# Patient Record
Sex: MALE | Race: WHITE | NOT HISPANIC OR LATINO | Employment: FULL TIME | RURAL
[De-identification: names, ages, dates, MRNs, and addresses within clinical notes are randomized per-mention and may not be internally consistent; named-entity substitution may affect disease eponyms.]

---

## 2022-07-12 ENCOUNTER — OFFICE VISIT (OUTPATIENT)
Dept: FAMILY MEDICINE CLINIC | Facility: CLINIC | Age: 57
End: 2022-07-12

## 2022-07-12 ENCOUNTER — TELEPHONE (OUTPATIENT)
Dept: FAMILY MEDICINE CLINIC | Facility: CLINIC | Age: 57
End: 2022-07-12

## 2022-07-12 VITALS
HEART RATE: 78 BPM | SYSTOLIC BLOOD PRESSURE: 102 MMHG | BODY MASS INDEX: 26.96 KG/M2 | HEIGHT: 69 IN | OXYGEN SATURATION: 97 % | WEIGHT: 182 LBS | TEMPERATURE: 98.2 F | DIASTOLIC BLOOD PRESSURE: 80 MMHG | RESPIRATION RATE: 18 BRPM

## 2022-07-12 DIAGNOSIS — F17.210 CIGARETTE NICOTINE DEPENDENCE WITHOUT COMPLICATION: ICD-10-CM

## 2022-07-12 DIAGNOSIS — Z13.220 SCREENING FOR HYPERLIPIDEMIA: ICD-10-CM

## 2022-07-12 DIAGNOSIS — Z13.1 SCREENING FOR DIABETES MELLITUS: ICD-10-CM

## 2022-07-12 DIAGNOSIS — F17.200 TOBACCO USE DISORDER: ICD-10-CM

## 2022-07-12 DIAGNOSIS — N52.9 ERECTILE DYSFUNCTION, UNSPECIFIED ERECTILE DYSFUNCTION TYPE: ICD-10-CM

## 2022-07-12 DIAGNOSIS — Z00.01 ENCOUNTER FOR GENERAL ADULT MEDICAL EXAMINATION WITH ABNORMAL FINDINGS: Primary | ICD-10-CM

## 2022-07-12 DIAGNOSIS — Z12.5 PROSTATE CANCER SCREENING: ICD-10-CM

## 2022-07-12 PROCEDURE — 99396 PREV VISIT EST AGE 40-64: CPT | Performed by: FAMILY MEDICINE

## 2022-07-12 RX ORDER — SILDENAFIL CITRATE 20 MG/1
20 TABLET ORAL 3 TIMES DAILY
COMMUNITY
End: 2022-07-12 | Stop reason: SDUPTHER

## 2022-07-12 RX ORDER — SILDENAFIL CITRATE 20 MG/1
TABLET ORAL
Qty: 75 TABLET | Refills: 11 | Status: SHIPPED | OUTPATIENT
Start: 2022-07-12

## 2022-07-12 NOTE — PROGRESS NOTES
"Chief Complaint  Annual Exam (Patient being seen for annual exam.)    Subjective      Nathan Ortega presents to [facility]  History of Present Illness    Patient is here for annual exam.  Says you have been feeling well overall lately.  His erectile dysfunction is well controlled with sildenafil.  Patient had an abnormal CT of the chest in April of last year, and saw general surgeon for consultation and underwent PET/CT, which was reportedly okay.  He was supposed to have a follow-up CT 6 months later, but he states that they never contacted him about that time.  He got busy and forgot about it.  Therefore he is due today.  Denies any symptoms of lung cancer, but he does still smoke and knows he needs to quit.  Objective   Vital Signs:   Vitals:    07/12/22 0842   BP: 102/80   BP Location: Left arm   Patient Position: Sitting   Cuff Size: Adult   Pulse: 78   Resp: 18   Temp: 98.2 °F (36.8 °C)   SpO2: 97%   Weight: 82.6 kg (182 lb)   Height: 175.3 cm (69\")      /80 (BP Location: Left arm, Patient Position: Sitting, Cuff Size: Adult)   Pulse 78   Temp 98.2 °F (36.8 °C)   Resp 18   Ht 175.3 cm (69\")   Wt 82.6 kg (182 lb)   SpO2 97%   BMI 26.88 kg/m²     Body mass index is 26.88 kg/m².    Review of Systems   Constitutional: Negative for chills, fever and unexpected weight loss.   HENT: Negative for ear discharge, ear pain, mouth sores, nosebleeds, rhinorrhea, sinus pressure, sore throat, swollen glands, trouble swallowing and voice change.    Eyes: Negative for blurred vision, double vision, pain, redness and visual disturbance.   Respiratory: Negative for cough, shortness of breath and wheezing.    Cardiovascular: Negative for chest pain, palpitations and leg swelling.        PND, orthopnea   Gastrointestinal: Negative for abdominal distention, abdominal pain, anal bleeding, blood in stool, constipation, diarrhea, nausea, vomiting and GERD.        Dysphagia, odynophagia "   Endocrine: Negative for polydipsia, polyphagia and polyuria.   Genitourinary: Positive for erectile dysfunction. Negative for dysuria, frequency, hematuria, urgency and urinary incontinence.   Musculoskeletal: Negative for arthralgias (unusual/atypica), gait problem, joint swelling and myalgias.   Skin: Negative for rash, skin lesions (worrisome/suspicious) and bruise.   Allergic/Immunologic: Negative for food allergies.   Neurological: Negative for dizziness, tremors, seizures, syncope, weakness, numbness and headache.   Hematological: Negative for adenopathy. Does not bruise/bleed easily.   Psychiatric/Behavioral: Negative for suicidal ideas and depressed mood. The patient is not nervous/anxious.        Past History:  Medical History: has a past medical history of Backache, ED (erectile dysfunction), Osteoarthritis, Skin cancer, and Tobacco user.   Surgical History: has a past surgical history that includes Neck surgery (2010) and Skin cancer excision.   Family History: family history includes COPD in his brother; Diabetes in his father; Liver cancer in his father; Osteoarthritis in his mother; Peripheral vascular disease in his father; Rheum arthritis in his maternal aunt; Sleep apnea in his brother; Throat cancer in his brother.   Social History: reports that he has been smoking. He does not have any smokeless tobacco history on file.      Current Outpatient Medications:   •  sildenafil (REVATIO) 20 MG tablet, 1-5 pills po 1hr before intercourse prn, Disp: 75 tablet, Rfl: 11    Allergies: Patient has no known allergies.    Physical Exam  Constitutional:       General: He is not in acute distress.     Appearance: He is normal weight. He is not toxic-appearing.   HENT:      Head: Normocephalic and atraumatic.      Right Ear: Tympanic membrane, ear canal and external ear normal.      Left Ear: Tympanic membrane, ear canal and external ear normal.      Nose: Nose normal. No rhinorrhea.      Mouth/Throat:       Mouth: Mucous membranes are moist.      Pharynx: Oropharynx is clear. No posterior oropharyngeal erythema.   Eyes:      General: No scleral icterus.     Extraocular Movements: Extraocular movements intact.      Conjunctiva/sclera: Conjunctivae normal.      Pupils: Pupils are equal, round, and reactive to light.   Neck:      Vascular: No carotid bruit.   Cardiovascular:      Rate and Rhythm: Normal rate and regular rhythm.      Pulses: Normal pulses.      Heart sounds: Normal heart sounds. No murmur heard.    No gallop.   Pulmonary:      Effort: Pulmonary effort is normal.      Breath sounds: Normal breath sounds. No wheezing, rhonchi or rales.   Chest:      Chest wall: No tenderness.   Abdominal:      General: Bowel sounds are normal. There is no distension.      Palpations: Abdomen is soft. There is no mass.      Tenderness: There is no abdominal tenderness. There is no guarding or rebound.   Musculoskeletal:         General: No swelling, tenderness or deformity. Normal range of motion.      Cervical back: Normal range of motion. No rigidity.      Right lower leg: No edema.      Left lower leg: No edema.   Lymphadenopathy:      Cervical: No cervical adenopathy.   Skin:     General: Skin is warm and dry.      Capillary Refill: Capillary refill takes less than 2 seconds.      Coloration: Skin is not pale.      Findings: No erythema or rash.   Neurological:      General: No focal deficit present.      Mental Status: He is alert and oriented to person, place, and time.      Cranial Nerves: No cranial nerve deficit.      Motor: No weakness.      Coordination: Coordination normal.      Gait: Gait normal.   Psychiatric:         Mood and Affect: Mood normal.         Behavior: Behavior normal.         Thought Content: Thought content normal.         Judgment: Judgment normal.          Result Review :                 Assessment and Plan   Diagnoses and all orders for this visit:    1. Encounter for general adult medical  examination with abnormal findings (Primary)  -     CBC Auto Differential; Future  -     Comprehensive Metabolic Panel; Future  Healthy lifestyle including diet and exercise (which she already does) along with smoking cessation were discussed.  Preventive healthcare measures were discussed.  We will schedule low-dose CT scan of the chest.  We will check labs and refill his sildenafil.  I will see him back in 1 year or sooner if needed.  2. Screening for hyperlipidemia  -     Lipid Panel; Future    3. Screening for diabetes mellitus  -     Hemoglobin A1c; Future    4. Prostate cancer screening  -     PSA Screen; Future    5. Erectile dysfunction, unspecified erectile dysfunction type    6. Tobacco use disorder  -     CT Chest Low Dose Wo; Future    7. Cigarette nicotine dependence without complication  -     CT Chest Low Dose Wo; Future  Failed to mention above, but he had a Cologuard in May 2020, so does not need colon cancer screening until next May other orders  -     sildenafil (REVATIO) 20 MG tablet; 1-5 pills po 1hr before intercourse prn  Dispense: 75 tablet; Refill: 11                 Follow Up   No follow-ups on file.  Patient was given instructions and counseling regarding his condition or for health maintenance advice. Please see specific information pulled into the AVS if appropriate.     Derek العلي MD

## 2022-07-12 NOTE — TELEPHONE ENCOUNTER
Please let patient know that I had some time to review his chart, and I cannot find any evidence that he has had colon cancer screening.  If he has had a colonoscopy or done a Cologuard recently, and please obtain the information from the appropriate source and let me know.  If he is not doing anything, can those are the best to options with colonoscopy being the gold standard.  If he does not have any history of polyps and has no first degree relatives (parents or sibling) with a history of colon cancer or polyps, then we can order a Cologuard kit that will be sent to his home.  It is 92% accurate on detecting abnormal DNA in the stool.  The colonoscopy is usually done every 10 years, but the Cologuard is every 3 years.  See if he has a preference and let me know.  Both test are fully covered by insurance

## 2022-07-13 LAB
ALBUMIN SERPL-MCNC: 4.5 G/DL (ref 3.8–4.9)
ALBUMIN/GLOB SERPL: 2 {RATIO} (ref 1.2–2.2)
ALP SERPL-CCNC: 65 IU/L (ref 44–121)
ALT SERPL-CCNC: 15 IU/L (ref 0–44)
AST SERPL-CCNC: 14 IU/L (ref 0–40)
BASOPHILS # BLD AUTO: 0.1 X10E3/UL (ref 0–0.2)
BASOPHILS NFR BLD AUTO: 1 %
BILIRUB SERPL-MCNC: 0.4 MG/DL (ref 0–1.2)
BUN SERPL-MCNC: 13 MG/DL (ref 6–24)
BUN/CREAT SERPL: 13 (ref 9–20)
CALCIUM SERPL-MCNC: 9.5 MG/DL (ref 8.7–10.2)
CHLORIDE SERPL-SCNC: 103 MMOL/L (ref 96–106)
CHOLEST SERPL-MCNC: 158 MG/DL (ref 100–199)
CO2 SERPL-SCNC: 23 MMOL/L (ref 20–29)
CREAT SERPL-MCNC: 1.03 MG/DL (ref 0.76–1.27)
EGFRCR SERPLBLD CKD-EPI 2021: 85 ML/MIN/1.73
EOSINOPHIL # BLD AUTO: 0.2 X10E3/UL (ref 0–0.4)
EOSINOPHIL NFR BLD AUTO: 2 %
ERYTHROCYTE [DISTWIDTH] IN BLOOD BY AUTOMATED COUNT: 12.6 % (ref 11.6–15.4)
GLOBULIN SER CALC-MCNC: 2.2 G/DL (ref 1.5–4.5)
GLUCOSE SERPL-MCNC: 91 MG/DL (ref 65–99)
HBA1C MFR BLD: 5.5 % (ref 4.8–5.6)
HCT VFR BLD AUTO: 47.1 % (ref 37.5–51)
HDLC SERPL-MCNC: 56 MG/DL
HGB BLD-MCNC: 16.5 G/DL (ref 13–17.7)
IMM GRANULOCYTES # BLD AUTO: 0 X10E3/UL (ref 0–0.1)
IMM GRANULOCYTES NFR BLD AUTO: 0 %
LDLC SERPL CALC-MCNC: 92 MG/DL (ref 0–99)
LYMPHOCYTES # BLD AUTO: 2.2 X10E3/UL (ref 0.7–3.1)
LYMPHOCYTES NFR BLD AUTO: 29 %
MCH RBC QN AUTO: 32.3 PG (ref 26.6–33)
MCHC RBC AUTO-ENTMCNC: 35 G/DL (ref 31.5–35.7)
MCV RBC AUTO: 92 FL (ref 79–97)
MONOCYTES # BLD AUTO: 0.6 X10E3/UL (ref 0.1–0.9)
MONOCYTES NFR BLD AUTO: 8 %
NEUTROPHILS # BLD AUTO: 4.6 X10E3/UL (ref 1.4–7)
NEUTROPHILS NFR BLD AUTO: 60 %
PLATELET # BLD AUTO: 282 X10E3/UL (ref 150–450)
POTASSIUM SERPL-SCNC: 5.1 MMOL/L (ref 3.5–5.2)
PROT SERPL-MCNC: 6.7 G/DL (ref 6–8.5)
PSA SERPL-MCNC: 0.9 NG/ML (ref 0–4)
RBC # BLD AUTO: 5.11 X10E6/UL (ref 4.14–5.8)
SODIUM SERPL-SCNC: 140 MMOL/L (ref 134–144)
TRIGL SERPL-MCNC: 49 MG/DL (ref 0–149)
VLDLC SERPL CALC-MCNC: 10 MG/DL (ref 5–40)
WBC # BLD AUTO: 7.6 X10E3/UL (ref 3.4–10.8)

## 2022-07-13 NOTE — TELEPHONE ENCOUNTER
Please make sure to get results off fax--that was just 2 months ago, and it's been a year since I saw him???

## 2022-08-08 ENCOUNTER — TELEPHONE (OUTPATIENT)
Dept: FAMILY MEDICINE CLINIC | Facility: CLINIC | Age: 57
End: 2022-08-08

## 2022-08-08 NOTE — TELEPHONE ENCOUNTER
Hub staff attempted to follow warm transfer process and was unsuccessful     Caller: Nathan Ortega    Relationship to patient: Self    Best call back number: [phone]    Patient is needing: PATIENT MISSED A CALL ABOUT GETTING A REFERRAL SCHEDULED FOR A CT SCAN.

## 2022-09-19 ENCOUNTER — TELEPHONE (OUTPATIENT)
Dept: FAMILY MEDICINE CLINIC | Facility: CLINIC | Age: 57
End: 2022-09-19

## 2022-09-19 NOTE — TELEPHONE ENCOUNTER
Caller: Nathan Ortega    Relationship: Self    Best call back number: [phone]    Caller requesting test results: SELF    What test was performed: CT SCAN    When was the test performed: 09/01/22    Where was the test performed: [facility]    Additional notes: WOULD LIKE A CALL BACK REGARDING RESULTS.WAITING AND HASN'T HEARD ANYTHING.

## 2024-09-27 RX ORDER — SILDENAFIL CITRATE 20 MG/1
TABLET ORAL
Qty: 100 TABLET | Refills: 0 | Status: SHIPPED | OUTPATIENT
Start: 2024-09-27

## 2024-10-18 ENCOUNTER — OFFICE VISIT (OUTPATIENT)
Dept: FAMILY MEDICINE CLINIC | Facility: CLINIC | Age: 59
End: 2024-10-18
Payer: COMMERCIAL

## 2024-10-18 VITALS
SYSTOLIC BLOOD PRESSURE: 128 MMHG | HEART RATE: 80 BPM | BODY MASS INDEX: 27.06 KG/M2 | DIASTOLIC BLOOD PRESSURE: 86 MMHG | WEIGHT: 189 LBS | HEIGHT: 70 IN | OXYGEN SATURATION: 97 %

## 2024-10-18 DIAGNOSIS — F17.210 CIGARETTE NICOTINE DEPENDENCE WITHOUT COMPLICATION: ICD-10-CM

## 2024-10-18 DIAGNOSIS — J20.9 ACUTE BRONCHITIS WITH WHEEZING: ICD-10-CM

## 2024-10-18 DIAGNOSIS — N52.9 ERECTILE DYSFUNCTION, UNSPECIFIED ERECTILE DYSFUNCTION TYPE: ICD-10-CM

## 2024-10-18 DIAGNOSIS — F17.200 TOBACCO USE DISORDER: ICD-10-CM

## 2024-10-18 DIAGNOSIS — Z00.01 ENCOUNTER FOR GENERAL ADULT MEDICAL EXAMINATION WITH ABNORMAL FINDINGS: Primary | ICD-10-CM

## 2024-10-18 DIAGNOSIS — Z12.5 PROSTATE CANCER SCREENING: ICD-10-CM

## 2024-10-18 PROCEDURE — 99396 PREV VISIT EST AGE 40-64: CPT | Performed by: FAMILY MEDICINE

## 2024-10-18 RX ORDER — SILDENAFIL CITRATE 20 MG/1
TABLET ORAL
Qty: 100 TABLET | Refills: 3 | Status: SHIPPED | OUTPATIENT
Start: 2024-10-18

## 2024-10-18 RX ORDER — ALBUTEROL SULFATE 90 UG/1
2 INHALANT RESPIRATORY (INHALATION) EVERY 4 HOURS PRN
Qty: 18 G | Refills: 2 | Status: SHIPPED | OUTPATIENT
Start: 2024-10-18

## 2024-10-18 NOTE — PROGRESS NOTES
"Chief Complaint  Annual Exam (Physical/Screenings needed)    Subjective      Nathan Ortega presents to [facility]  History of Present Illness  Patient is here for annual physical exam.  He says he has been feeling pretty well overall lately, although he has recently gotten over a URI, and has had some wheezing and cough over the last few weeks.  He denies any fever chills chest pain or hemoptysis.  The patient has a very high number of pack years smoking history but has never had COPD symptoms before.  He says that he is never really had any trouble with wheezing or prolonged cough like this, and while he feels like he is better, the wheezing and cough have been lingering on the last couple of weeks.  His wife also developed URI symptoms about a week ago but tested negative for COVID and the flu.    Patient has been through a stressful past year and that his wife was diagnosed with some type of rare autoimmune disorder that is significantly diminished her vision.  He has a lot of confusion and questions about this, but his wife is not my patient so I explained that I really could not answer the questions without knowing more, and he said that he will be going with her to an appointment to her rheumatologist in 2 weeks.  I strongly recommended that they put together a notebook and write down the things that she has been told, and write questions before hand to ask when she goes to the appointment.    Because of his wife's health problems, the patient has not been needing the sildenafil as often, but when he does use it it works well with no significant adverse effects.  Objective   Vital Signs:   Vitals:    10/18/24 1015   BP: 128/86   Pulse: 80   SpO2: 97%   Weight: 85.7 kg (189 lb)   Height: 177.8 cm (70\")      /86   Pulse 80   Ht 177.8 cm (70\")   Wt 85.7 kg (189 lb)   SpO2 97%   BMI 27.12 kg/m²     Body mass index is 27.12 kg/m².    Review of Systems   Constitutional:  " Negative for chills, diaphoresis, fever and unexpected weight loss.   HENT:  Negative for ear discharge, ear pain, mouth sores, nosebleeds, rhinorrhea, sinus pressure, sore throat, swollen glands and trouble swallowing.    Eyes:  Negative for blurred vision, double vision, pain, redness and visual disturbance.   Respiratory:  Positive for cough and wheezing. Negative for choking, chest tightness, shortness of breath and stridor.    Cardiovascular:  Negative for chest pain, palpitations and leg swelling.        PND, orthopnea   Gastrointestinal:  Negative for abdominal distention, abdominal pain, blood in stool, constipation, diarrhea, nausea, vomiting and GERD.        Dysphagia, odynophagia   Endocrine: Negative for polydipsia, polyphagia and polyuria.   Genitourinary:  Negative for dysuria, hematuria and urinary incontinence.   Musculoskeletal:  Negative for arthralgias (unusual/atypica), back pain, gait problem, joint swelling, myalgias and neck pain.   Skin:  Negative for rash, skin lesions (worrisome/suspicious) and bruise.   Allergic/Immunologic: Positive for environmental allergies. Negative for food allergies.   Neurological:  Negative for dizziness, tremors, seizures, syncope, weakness, light-headedness, numbness and headache.   Hematological:  Negative for adenopathy. Does not bruise/bleed easily.   Psychiatric/Behavioral:  Negative for suicidal ideas and depressed mood. The patient is not nervous/anxious.        Past History:  Medical History: has a past medical history of Backache, Colon cancer screening (07/2023), ED (erectile dysfunction), Osteoarthritis, Skin cancer, and Tobacco user.   Surgical History: has a past surgical history that includes Neck surgery (2010) and Skin cancer excision.   Family History: family history includes COPD in his brother; Diabetes in his father; Liver cancer in his father; Osteoarthritis in his mother; Peripheral vascular disease in his father; Rheum arthritis in his  maternal aunt; Sleep apnea in his brother; Throat cancer in his brother.   Social History: reports that he has been smoking cigarettes. He started smoking about 38 years ago. He has a 38.8 pack-year smoking history. He has been exposed to tobacco smoke. He has never used smokeless tobacco.      Current Outpatient Medications:     sildenafil (REVATIO) 20 MG tablet, TAKE 1-5 TABLETS BY MOUTH 1 HOUR BEFORE INTERCOURSE AS NEEDED, Disp: 100 tablet, Rfl: 3    albuterol sulfate  (90 Base) MCG/ACT inhaler, Inhale 2 puffs Every 4 (Four) Hours As Needed for Wheezing or Shortness of Air., Disp: 18 g, Rfl: 2    Allergies: Patient has no known allergies.    Physical Exam  Constitutional:       General: He is not in acute distress.     Appearance: He is not toxic-appearing.   HENT:      Head: Normocephalic and atraumatic.      Right Ear: Tympanic membrane, ear canal and external ear normal.      Left Ear: Tympanic membrane, ear canal and external ear normal.      Nose: Nose normal.      Mouth/Throat:      Mouth: Mucous membranes are moist.      Pharynx: Oropharynx is clear.   Eyes:      General: No scleral icterus.     Extraocular Movements: Extraocular movements intact.      Conjunctiva/sclera: Conjunctivae normal.      Pupils: Pupils are equal, round, and reactive to light.   Neck:      Vascular: No carotid bruit.   Cardiovascular:      Rate and Rhythm: Normal rate and regular rhythm.      Pulses: Normal pulses.      Heart sounds: Normal heart sounds. No murmur heard.     No gallop.   Pulmonary:      Effort: Pulmonary effort is normal.      Breath sounds: Wheezing (Diffuse some mild wheezes heard) present. No rhonchi or rales.   Chest:      Chest wall: No tenderness.   Abdominal:      General: Bowel sounds are normal. There is no distension.      Palpations: Abdomen is soft. There is no mass.      Tenderness: There is no abdominal tenderness. There is no guarding or rebound.   Musculoskeletal:         General: No  swelling or deformity. Normal range of motion.      Cervical back: Normal range of motion. No rigidity.      Right lower leg: No edema.      Left lower leg: No edema.   Lymphadenopathy:      Cervical: No cervical adenopathy.   Skin:     General: Skin is warm and dry.      Capillary Refill: Capillary refill takes less than 2 seconds.      Coloration: Skin is not jaundiced.      Findings: No bruising, erythema or rash.   Neurological:      General: No focal deficit present.      Mental Status: He is alert and oriented to person, place, and time.      Cranial Nerves: No cranial nerve deficit.      Motor: No weakness.      Coordination: Coordination normal.      Gait: Gait normal.   Psychiatric:         Mood and Affect: Mood normal.         Behavior: Behavior normal.         Thought Content: Thought content normal.         Judgment: Judgment normal.                   Assessment and Plan   Diagnoses and all orders for this visit:    1. Encounter for general adult medical examination with abnormal findings (Primary)  -     CBC & Differential  -     Comprehensive Metabolic Panel  -     Hemoglobin A1c  -     Lipid Panel  Healthy lifestyle measures including healthy diet regular exercise and weight control were discussed.  Smoking cessation also strongly encouraged.  We will check labs send patient will continue current medications.  He has a few months overdue for his lung cancer screening, which he said could not be help because of his wife's health problems, but we will get this scheduled soon.  2. Prostate cancer screening  -     PSA Screen    3. Cigarette nicotine dependence without complication  -     CT Chest Low Dose Wo; Future    4. Tobacco use disorder  -     CT Chest Low Dose Wo; Future    5. Erectile dysfunction, unspecified erectile dysfunction type    6. Acute bronchitis with wheezing  Strongly suspect that the patient has underlying COPD and simply has been asymptomatic until now.  However, since he says he  generally never has any symptoms, and is just gotten over it 2 and half to 3-week upper respiratory infection, I will treat him with albuterol as needed, side effects and proper use discussed.  If he continues to feel like he needs the albuterol longer than 1 to 2 weeks, and more often than once a week, then the patient really should have pulmonary function test and started on a COPD maintenance inhaler, and he was educated about this.  The patient would also benefit from all the recommended respiratory vaccines, but he says he is reluctant to take anything until he gets over the cough and makes sure that his wife's rheumatologist says that it is safe, even though I explained that the flu vaccine, COVID-vaccine, and shingles vaccine do not have any live virus in them.  Other orders  -     albuterol sulfate  (90 Base) MCG/ACT inhaler; Inhale 2 puffs Every 4 (Four) Hours As Needed for Wheezing or Shortness of Air.  Dispense: 18 g; Refill: 2  -     sildenafil (REVATIO) 20 MG tablet; TAKE 1-5 TABLETS BY MOUTH 1 HOUR BEFORE INTERCOURSE AS NEEDED  Dispense: 100 tablet; Refill: 3            Follow Up   Return in about 1 year (around 10/18/2025) for Annual physical.  Patient was given instructions and counseling regarding his condition or for health maintenance advice. Please see specific information pulled into the AVS if appropriate.     Derek العلي MD

## 2024-10-19 LAB
ALBUMIN SERPL-MCNC: 4.4 G/DL (ref 3.8–4.9)
ALP SERPL-CCNC: 68 IU/L (ref 44–121)
ALT SERPL-CCNC: 21 IU/L (ref 0–44)
AST SERPL-CCNC: 20 IU/L (ref 0–40)
BASOPHILS # BLD AUTO: 0.1 X10E3/UL (ref 0–0.2)
BASOPHILS NFR BLD AUTO: 1 %
BILIRUB SERPL-MCNC: 0.6 MG/DL (ref 0–1.2)
BUN SERPL-MCNC: 12 MG/DL (ref 6–24)
BUN/CREAT SERPL: 13 (ref 9–20)
CALCIUM SERPL-MCNC: 9.9 MG/DL (ref 8.7–10.2)
CHLORIDE SERPL-SCNC: 99 MMOL/L (ref 96–106)
CHOLEST SERPL-MCNC: 172 MG/DL (ref 100–199)
CO2 SERPL-SCNC: 27 MMOL/L (ref 20–29)
CREAT SERPL-MCNC: 0.92 MG/DL (ref 0.76–1.27)
EGFRCR SERPLBLD CKD-EPI 2021: 96 ML/MIN/1.73
EOSINOPHIL # BLD AUTO: 0.1 X10E3/UL (ref 0–0.4)
EOSINOPHIL NFR BLD AUTO: 2 %
ERYTHROCYTE [DISTWIDTH] IN BLOOD BY AUTOMATED COUNT: 12.2 % (ref 11.6–15.4)
GLOBULIN SER CALC-MCNC: 1.4 G/DL (ref 1.5–4.5)
GLUCOSE SERPL-MCNC: 92 MG/DL (ref 70–99)
HBA1C MFR BLD: 5.6 % (ref 4.8–5.6)
HCT VFR BLD AUTO: 54.7 % (ref 37.5–51)
HDLC SERPL-MCNC: 54 MG/DL
HGB BLD-MCNC: 17.8 G/DL (ref 13–17.7)
IMM GRANULOCYTES # BLD AUTO: 0 X10E3/UL (ref 0–0.1)
IMM GRANULOCYTES NFR BLD AUTO: 0 %
LDLC SERPL CALC-MCNC: 103 MG/DL (ref 0–99)
LYMPHOCYTES # BLD AUTO: 1.8 X10E3/UL (ref 0.7–3.1)
LYMPHOCYTES NFR BLD AUTO: 26 %
MCH RBC QN AUTO: 32.1 PG (ref 26.6–33)
MCHC RBC AUTO-ENTMCNC: 32.5 G/DL (ref 31.5–35.7)
MCV RBC AUTO: 99 FL (ref 79–97)
MONOCYTES # BLD AUTO: 0.5 X10E3/UL (ref 0.1–0.9)
MONOCYTES NFR BLD AUTO: 8 %
NEUTROPHILS # BLD AUTO: 4.4 X10E3/UL (ref 1.4–7)
NEUTROPHILS NFR BLD AUTO: 63 %
PLATELET # BLD AUTO: 261 X10E3/UL (ref 150–450)
POTASSIUM SERPL-SCNC: 5.1 MMOL/L (ref 3.5–5.2)
PROT SERPL-MCNC: 5.8 G/DL (ref 6–8.5)
PSA SERPL-MCNC: 1.1 NG/ML (ref 0–4)
RBC # BLD AUTO: 5.55 X10E6/UL (ref 4.14–5.8)
SODIUM SERPL-SCNC: 138 MMOL/L (ref 134–144)
TRIGL SERPL-MCNC: 78 MG/DL (ref 0–149)
VLDLC SERPL CALC-MCNC: 15 MG/DL (ref 5–40)
WBC # BLD AUTO: 6.9 X10E3/UL (ref 3.4–10.8)

## 2024-10-20 DIAGNOSIS — D75.1 POLYCYTHEMIA: Primary | ICD-10-CM
